# Patient Record
Sex: FEMALE | Race: WHITE | NOT HISPANIC OR LATINO | URBAN - METROPOLITAN AREA
[De-identification: names, ages, dates, MRNs, and addresses within clinical notes are randomized per-mention and may not be internally consistent; named-entity substitution may affect disease eponyms.]

---

## 2018-06-13 ENCOUNTER — OFFICE VISIT (OUTPATIENT)
Dept: FAMILY MEDICINE | Facility: CLINIC | Age: 28
End: 2018-06-13
Payer: COMMERCIAL

## 2018-06-13 VITALS
TEMPERATURE: 98.4 F | DIASTOLIC BLOOD PRESSURE: 72 MMHG | HEIGHT: 64 IN | BODY MASS INDEX: 29.26 KG/M2 | WEIGHT: 171.38 LBS | HEART RATE: 92 BPM | SYSTOLIC BLOOD PRESSURE: 118 MMHG

## 2018-06-13 DIAGNOSIS — Z12.4 SCREENING FOR MALIGNANT NEOPLASM OF CERVIX: ICD-10-CM

## 2018-06-13 DIAGNOSIS — Z23 NEED FOR PROPHYLACTIC VACCINATION WITH TETANUS-DIPHTHERIA (TD): ICD-10-CM

## 2018-06-13 DIAGNOSIS — G43.009 MIGRAINE WITHOUT AURA AND WITHOUT STATUS MIGRAINOSUS, NOT INTRACTABLE: ICD-10-CM

## 2018-06-13 DIAGNOSIS — Z76.89 ENCOUNTER TO ESTABLISH CARE: ICD-10-CM

## 2018-06-13 DIAGNOSIS — Z00.01 ENCOUNTER FOR ROUTINE ADULT MEDICAL EXAM WITH ABNORMAL FINDINGS: Primary | ICD-10-CM

## 2018-06-13 DIAGNOSIS — K21.9 GASTROESOPHAGEAL REFLUX DISEASE, ESOPHAGITIS PRESENCE NOT SPECIFIED: ICD-10-CM

## 2018-06-13 PROCEDURE — 99385 PREV VISIT NEW AGE 18-39: CPT | Mod: 25 | Performed by: PHYSICIAN ASSISTANT

## 2018-06-13 PROCEDURE — G0145 SCR C/V CYTO,THINLAYER,RESCR: HCPCS | Performed by: PHYSICIAN ASSISTANT

## 2018-06-13 PROCEDURE — 90715 TDAP VACCINE 7 YRS/> IM: CPT | Performed by: PHYSICIAN ASSISTANT

## 2018-06-13 PROCEDURE — 90471 IMMUNIZATION ADMIN: CPT | Performed by: PHYSICIAN ASSISTANT

## 2018-06-13 NOTE — PATIENT INSTRUCTIONS
PPI taper (Prilosec)  Try tapering off of this to decrease your risk of kidney disease and osteoporosis.  Take every other day, x 1 week, then every 3rd day x 1 week, then stop  Start over the counter Zantac at the same time you start the taper; 1 tablet twice daily (150 mg)  Can use Omeprazole as needed, or for short term for severe symptoms.    If doesn't work, may need scope of the esophagus.    Try holding the Excedrin for 2 weeks to see if your headaches could be due to medication overuse.  See information below, treat headaches with these recommendations.    [facility] headaches.    Ines Stewart PA-C                       Tension Headaches  Tension headaches cause a dull, steady pain on both sides of the head and in the neck and the back of the head. The eyes may also feel tired. Tension headaches can be triggered by lack of sleep, poor posture, eyestrain, stress, and other factors.        To help prevent tension headaches:    Make sure your work area is properly set up to help you avoid neck strain and eyestrain.    Make sure that your eyeglass prescription is current and is appropriate for the work you do.    Learn techniques for relaxing and reducing emotional stress. These include deep breathing, progressive relaxation, and biofeedback.    Maintain a regular exercise regimen under the guidance of a doctor. This can help keep your neck and back flexible, strong, and relaxed.  To relieve the pain:    Use moist heat to relax the muscles. Soak in a hot bath or wrap a warm, moist towel around your neck.    Brush your scalp lightly with a soft hairbrush.    Give yourself a massage. Knead the muscles running from your shoulders up the back of your skull.    Use an ice pack. Apply this directly to the place where you feel pain.    Rest. Sleeping often helps relieve headache pain.    Drink plenty of fluids. Dehydration is another trigger for headaches.             Preventive Health  Recommendations  Female Ages 26 - 39  Yearly exam:   See your health care provider every year in order to    Review health changes.     Discuss preventive care.      Review your medicines if you your doctor has prescribed any.    Until age 30: Get a Pap test every three years (more often if you have had an abnormal result).    After age 30: Talk to your doctor about whether you should have a Pap test every 3 years or have a Pap test with HPV screening every 5 years.   You do not need a Pap test if your uterus was removed (hysterectomy) and you have not had cancer.  You should be tested each year for STDs (sexually transmitted diseases), if you're at risk.   Talk to your provider about how often to have your cholesterol checked.  If you are at risk for diabetes, you should have a diabetes test (fasting glucose).  Shots: Get a flu shot each year. Get a tetanus shot every 10 years.   Nutrition:     Eat at least 5 servings of fruits and vegetables each day.    Eat whole-grain bread, whole-wheat pasta and brown rice instead of white grains and rice.    Talk to your provider about Calcium and Vitamin D.     Lifestyle    Exercise at least 150 minutes a week (30 minutes a day, 5 days of the week). This will help you control your weight and prevent disease.    Limit alcohol to one drink per day.    No smoking.     Wear sunscreen to prevent skin cancer.    See your dentist every six months for an exam and cleaning.  [facility]   Discharged by : Cori SPICER Certified Medical Assistant (AAMA)    If you have any questions regarding to your visit please contact your care team:     Team Silver              Clinic Hours Telephone Number     Dr. Ascencion Stewart PA-C   7am-7pm  Monday - Thursday   7am-5pm  Fridays  [phone]   (Appointment scheduling available 24/7)     RN Line  [phone] option 2     Urgent Care - [place] -  11am-9pm Monday-Friday Saturday-Sunday- 9am-5pm     [place] -   5pm-9pm Monday-Friday Saturday-Sunday- 9am-5pm    [phone] - [place]    [phone] - [place]     For a Price Quote for your services, please call our Consumer Price Line at [phone].     What options do I have for visits at the clinic other than the traditional office visit?     To expand how we care for you, many of our providers are utilizing electronic visits (e-visits) and telephone visits, when medically appropriate, for interactions with their patients rather than a visit in the clinic. We also offer nurse visits for many medical concerns. Just like any other service, we will bill your insurance company for this type of visit based on time spent on the phone with your provider. Not all insurance companies cover these visits. Please check with your medical insurance if this type of visit is covered. You will be responsible for any charges that are not paid by your insurance.   E-visits via Over[employer]: generally incur a $35.00 fee.     Telephone visits:   Time spent on the phone: *charged based on time that is spent on the phone in increments of 10 minutes. Estimated cost:   5-10 mins $30.00   11-20 mins. $59.00   21-30 mins. $85.00     Use [employer] (secure email communication and access to your chart) to send your primary care provider a message or make an appointment. Ask someone on your Team how to sign up for Over[employer].     As always, Thank you for trusting us with your health care needs!      [facility]:    Scheduling Appointments  [place] [place]  Call: [phone]    [place]  Call: [phone]    [facility]  Call: [phone]    For Gastroenterology referrals   [facility]   Clinics and Surgery Center, 4th Floor   [address] [place] [address]   Appointments: [phone]    WHERE TO GO FOR  CARE?  Clinic    Make an appointment if you:       Are sick (cold, cough, flu, sore throat, earache or in pain).       Have a small injury (sprain, small cut, burn or broken bone).       Need a physical exam, Pap smear, vaccine or prescription refill.       Have questions about your health or medicines.    To reach us:      Call [phone] ([phone]). Open 24 hours every day. (For counseling services, call [phone].)    Log into Home-Account at [employer]. (Visit [email or web address] to create an account.) Hospital emergency room    An emergency is a serious or life- threatening problem that must be treated right away.    Call 159 or get to the hospital if you have:      Very bad or sudden:            - Chest pain or pressure         - Bleeding         - Head or belly pain         - Dizziness or trouble seeing, walking or                          Speaking      Problems breathing      Blood in your vomit or you are coughing up blood      A major injury (knocked out, loss of a finger or limb, rape, broken bone protruding from skin)    A mental health crisis. (Or call the Mental Health Crisis line at 1-138.537.4902 or Suicide Prevention Hotline at 1-609.649.9639.)    Open 24 hours every day. You don't need an appointment.     Urgent care    Visit urgent care for sickness or small injuries when the clinic is closed. You don't need an appointment. To check hours or find an urgent care near you, visit [email or web address]. Online care    Get online care from [facility] for more than 70 common problems, like colds, allergies and infections. Open 24 hours every day at:   [email or web address]   Need help deciding?    For advice about where to be seen, you may call your clinic and ask to speak with a nurse. We're here for you 24 hours every day.         If you are deaf or hard of hearing, please let us know. We provide many free services including sign language interpreters, oral interpreters, TTYs, telephone  amplifiers, note takers and written materials.

## 2018-06-13 NOTE — NURSING NOTE
Prior to injection verified patient identity using patient's name and date of birth.  Due to injection administration, patient instructed to remain in clinic for 15 minutes  afterwards, and to report any adverse reaction to me immediately.    Screening Questionnaire for Adult Immunization    Are you sick today?   No   Do you have allergies to medications, food, a vaccine component or latex?   No   Have you ever had a serious reaction after receiving a vaccination?   No   Do you have a long-term health problem with heart disease, lung disease, asthma, kidney disease, metabolic disease (e.g. diabetes), anemia, or other blood disorder?   No   Do you have cancer, leukemia, HIV/AIDS, or any other immune system problem?   No   In the past 3 months, have you taken medications that affect  your immune system, such as prednisone, other steroids, or anticancer drugs; drugs for the treatment of rheumatoid arthritis, Crohn s disease, or psoriasis; or have you had radiation treatments?   No   Have you had a seizure, or a brain or other nervous system problem?   No   During the past year, have you received a transfusion of blood or blood     products, or been given immune (gamma) globulin or antiviral drug?   No   For women: Are you pregnant or is there a chance you could become        pregnant during the next month?   No   Have you received any vaccinations in the past 4 weeks?   No     Immunization questionnaire answers were all negative.        Per orders of Ines Stewart, injection of Adacel given by Cori Johnson. Patient instructed to remain in clinic for 15 minutes afterwards, and to report any adverse reaction to me immediately.       Screening performed by Cori Johnson on 6/13/2018 at 10:54 AM.

## 2018-06-13 NOTE — LETTER
June 20, 2018      Afsaneh Estrella  [address] [place] [address]    Dear ,      I am happy to inform you that your recent cervical cancer screening test (PAP smear) was normal.      Preventative screenings such as this help to ensure your health for years to come. You should repeat a pap smear in 3 years, unless otherwise directed.      You will still need to return to the clinic every year for your annual exam and other preventive tests.     Please contact the clinic at [phone] if you have further questions.       Sincerely,      Ines Stewart PA-C/delano

## 2018-06-13 NOTE — MR AVS SNAPSHOT
After Visit Summary   6/13/2018    Afsaneh Estrella    MRN: [id number]           Patient Information     Date Of Birth          1990        Visit Information        Provider Department      6/13/2018 9:40 AM Ines Stewart PA-C [facility]        Today's Diagnoses     Encounter for routine adult medical exam with abnormal findings    -  1    Screening for malignant neoplasm of cervix        Need for prophylactic vaccination with tetanus-diphtheria (TD)        Encounter to establish care        Gastroesophageal reflux disease, esophagitis presence not specified        Migraine without aura and without status migrainosus, not intractable          Care Instructions    PPI taper (Prilosec)  Try tapering off of this to decrease your risk of kidney disease and osteoporosis.  Take every other day, x 1 week, then every 3rd day x 1 week, then stop  Start over the counter Zantac at the same time you start the taper; 1 tablet twice daily (150 mg)  Can use Omeprazole as needed, or for short term for severe symptoms.    If doesn't work, may need scope of the esophagus.    Try holding the Excedrin for 2 weeks to see if your headaches could be due to medication overuse.  See information below, treat headaches with these recommendations.    [facility] with headaches.    Ines Stewart PA-C                       Tension Headaches  Tension headaches cause a dull, steady pain on both sides of the head and in the neck and the back of the head. The eyes may also feel tired. Tension headaches can be triggered by lack of sleep, poor posture, eyestrain, stress, and other factors.        To help prevent tension headaches:    Make sure your work area is properly set up to help you avoid neck strain and eyestrain.    Make sure that your eyeglass prescription is current and is appropriate for the work you do.    Learn techniques for relaxing and reducing emotional stress. These  include deep breathing, progressive relaxation, and biofeedback.    Maintain a regular exercise regimen under the guidance of a doctor. This can help keep your neck and back flexible, strong, and relaxed.  To relieve the pain:    Use moist heat to relax the muscles. Soak in a hot bath or wrap a warm, moist towel around your neck.    Brush your scalp lightly with a soft hairbrush.    Give yourself a massage. Knead the muscles running from your shoulders up the back of your skull.    Use an ice pack. Apply this directly to the place where you feel pain.    Rest. Sleeping often helps relieve headache pain.    Drink plenty of fluids. Dehydration is another trigger for headaches.             Preventive Health Recommendations  Female Ages 26 - 39  Yearly exam:   See your health care provider every year in order to    Review health changes.     Discuss preventive care.      Review your medicines if you your doctor has prescribed any.    Until age 30: Get a Pap test every three years (more often if you have had an abnormal result).    After age 30: Talk to your doctor about whether you should have a Pap test every 3 years or have a Pap test with HPV screening every 5 years.   You do not need a Pap test if your uterus was removed (hysterectomy) and you have not had cancer.  You should be tested each year for STDs (sexually transmitted diseases), if you're at risk.   Talk to your provider about how often to have your cholesterol checked.  If you are at risk for diabetes, you should have a diabetes test (fasting glucose).  Shots: Get a flu shot each year. Get a tetanus shot every 10 years.   Nutrition:     Eat at least 5 servings of fruits and vegetables each day.    Eat whole-grain bread, whole-wheat pasta and brown rice instead of white grains and rice.    Talk to your provider about Calcium and Vitamin D.     Lifestyle    Exercise at least 150 minutes a week (30 minutes a day, 5 days of the week). This will help you control  your weight and prevent disease.    Limit alcohol to one drink per day.    No smoking.     Wear sunscreen to prevent skin cancer.    See your dentist every six months for an exam and cleaning.  [facility]   Discharged by : Cori SPICER Certified Medical Assistant (AAMA)    If you have any questions regarding to your visit please contact your care team:     Team Silver              Clinic Hours Telephone Number     Dr. Ascencion Stewart PA-C   7am-7pm  Monday - Thursday   7am-5pm  Fridays  [phone]   (Appointment scheduling available 24/7)     RN Line  [phone] option 2     [facility] [place] [facility] 11am-9pm Monday-Friday Saturday-Sunday- 9am-5pm     [place] -   5pm-9pm Monday-Friday Saturday-Sunday- 9am-5pm    [phone] - [place]    [phone] - [place]     For a Price Quote for your services, please call our Consumer Price Line at [phone].     What options do I have for visits at the clinic other than the traditional office visit?     To expand how we care for you, many of our providers are utilizing electronic visits (e-visits) and telephone visits, when medically appropriate, for interactions with their patients rather than a visit in the clinic. We also offer nurse visits for many medical concerns. Just like any other service, we will bill your insurance company for this type of visit based on time spent on the phone with your provider. Not all insurance companies cover these visits. Please check with your medical insurance if this type of visit is covered. You will be responsible for any charges that are not paid by your insurance.   E-visits via iFood: generally incur a $35.00 fee.     Telephone visits:   Time spent on the phone: *charged based on time that is spent on the phone in increments of 10 minutes. Estimated cost:   5-10 mins $30.00   11-20 mins. $59.00   21-30 mins. $85.00      Use [employer] (secure email communication and access to your chart) to send your primary care provider a message or make an appointment. Ask someone on your Team how to sign up for [employer].     As always, Thank you for trusting us with your health care needs!      [place] [facility]:    Scheduling Appointments  [facility]  Call: [phone]    [facility]  Call: [phone]    [facility]  Call: [phone]    For Gastroenterology referrals   [facility]   Clinics and Surgery Center, 4th Floor   [address] [place] [address]   Appointments: [phone]    WHERE TO GO FOR CARE?  Clinic    Make an appointment if you:       Are sick (cold, cough, flu, sore throat, earache or in pain).       Have a small injury (sprain, small cut, burn or broken bone).       Need a physical exam, Pap smear, vaccine or prescription refill.       Have questions about your health or medicines.    To reach us:      Call [phone] ([phone]). Open 24 hours every day. (For counseling services, call [phone].)    Log into [employer] at [email or web address]. (Visit [email or web address] to create an account.) Hospital emergency room    An emergency is a serious or life- threatening problem that must be treated right away.    Call 363 or get to the hospital if you have:      Very bad or sudden:            - Chest pain or pressure         - Bleeding         - Head or belly pain         - Dizziness or trouble seeing, walking or                          Speaking      Problems breathing      Blood in your vomit or you are coughing up blood      A major injury (knocked out, loss of a finger or limb, rape, broken bone protruding from skin)    A mental health crisis. (Or call the Mental Health Crisis line at 1-667.526.1614 or Suicide Prevention Hotline at 1-472.876.2881.)    Open 24 hours every day. You don't need an  "appointment.     Urgent care    Visit urgent care for sickness or small injuries when the clinic is closed. You don't need an appointment. To check hours or find an urgent care near you, visit [email or web address]. Online care    Get online care from [facility] for more than 70 common problems, like colds, allergies and infections. Open 24 hours every day at:   [email or web address]   Need help deciding?    For advice about where to be seen, you may call your clinic and ask to speak with a nurse. We're here for you 24 hours every day.         If you are deaf or hard of hearing, please let us know. We provide many free services including sign language interpreters, oral interpreters, TTYs, telephone amplifiers, note takers and written materials.               Follow-ups after your visit        Who to contact     If you have questions or need follow up information about today's clinic visit or your schedule please contact [facility] directly at [phone].  Normal or non-critical lab and imaging results will be communicated to you by MyChart, letter or phone within 4 business days after the clinic has received the results. If you do not hear from us within 7 days, please contact the clinic through LocPlanethart or phone. If you have a critical or abnormal lab result, we will notify you by phone as soon as possible.  Submit refill requests through [employer] or call your pharmacy and they will forward the refill request to us. Please allow 3 business days for your refill to be completed.          Additional Information About Your Visit        Care EveryWhere ID     This is your Care EveryWhere ID. This could be used by other organizations to access your [place] medical records  [id number]        Your Vitals Were     Pulse Temperature Height Last Period BMI (Body Mass Index)       92 98.4  F (36.9  C) (Oral) 5' 4.25\" (1.632 m) 04/18/2018 29.19 kg/m2        Blood Pressure from Last 3 Encounters:   06/13/18 118/72    " Weight from Last 3 Encounters:   06/13/18 171 lb 6 oz (77.7 kg)              We Performed the Following     Pap imaged thin layer screen reflex to HPV if ASCUS - recommend age 25 - 29     TDAP VACCINE (ADACEL)        Primary Care Provider Office Phone # Fax #    Ines Patricia Stewart PA-C [phone]       [address] [address]        Equal Access to Services     BERNARDO BRITT : Hadii aad ku hadasho Soomaali, waaxda luqadaha, qaybta kaalmada adeegyada, waxay idiin hayaan adeeg kharash la'aan . So [facility] [phone].    ATENCIÓN: Si habla español, tiene a dumont disposición servicios gratuitos de asistencia lingüística. Llame al [phone].    We comply with applicable federal civil rights laws and [place] laws. We do not discriminate on the basis of race, color, national origin, age, disability, sex, sexual orientation, or gender identity.            Thank you!     Thank you for choosing [facility]  for your care. Our goal is always to provide you with excellent care. Hearing back from our patients is one way we can continue to improve our services. Please take a few minutes to complete the written survey that you may receive in the mail after your visit with us. Thank you!             Your Updated Medication List - Protect others around you: Learn how to safely use, store and throw away your medicines at www.disposemymeds.org.          This list is accurate as of 6/13/18 10:49 AM.  Always use your most recent med list.                   Brand Name Dispense Instructions for use Diagnosis    PRILOSEC PO           STOOL SOFTENER PO

## 2018-06-13 NOTE — PROGRESS NOTES
SUBJECTIVE:   CC: Afsaneh Estrella is an 28 year old woman who presents for preventive health visit.     Physical   Annual:     Getting at least 3 servings of Calcium per day::  Yes    Bi-annual eye exam::  Yes    Dental care twice a year::  Yes    Sleep apnea or symptoms of sleep apnea::  Daytime drowsiness    Diet::  Regular (no restrictions)    Frequency of exercise::  4-5 days/week    Duration of exercise::  Greater than 60 minutes    Taking medications regularly::  Yes    Medication side effects::  None    Additional concerns today::  YES (Discuss acid reflux.  Has history migraines and family history of them also.  Discuss medication options. Has history of irregular periods. Has psoriosis.)              Patient is here today to Establish Care.  Coming from [facility] (has not been seen in years though).  AUSTIN was completed by patient today while in office.    Has been taking Omeprazole 20 mg per day at night for quite awhile.  Coughing up food, happens more with spicy foods, worse with alcohol. Tried changing to Zantac when warnings regarding PPIs came. No family history.  Started in 9th grade, recalls stress. If avoids her food triggers, and takes her Prilosec daily then symptoms are well controlled. Normal BMs, no black benjamín    Migraines-has strong FH of this in mom, sister, grandfather.    Has a lot of triggers (hungry, tired, thirsty, changing weather, temp changes. Possible tension type headaches as well.  Massage of neck helps with these symptoms.  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  Has used Imitrex in the past but doesn't tolerate this very well. Excedrin works if takes it right away.  Takes this daily, caffeine helps, sleep.  At times wakes up with these.  Hard to differentiate between tension and migraine    Has psoriasis, fairly well controlled.  Scalp and plaque psoriasis. Worse lately as she is working inside and has less sun exposure.  Has a dermatologist, on different 3 different  "prescriptions for this.  Shampoo, scalp solution.    Irregular periods, negative work up.  Was on OCP and was regulated.  Now, has been of this x 4 years and is irregular but she is okay with this. Age of Menarche in 3rd grade       Today's PHQ-2 Score:   PHQ-2 ( 1999 Pfizer) 6/13/2018   Q1: Little interest or pleasure in doing things 1   Q2: Feeling down, depressed or hopeless 0   PHQ-2 Score 1   Q1: Little interest or pleasure in doing things Several days   Q2: Feeling down, depressed or hopeless Not at all   PHQ-2 Score 1       Abuse: Current or Past(Physical, Sexual or Emotional)- No  Do you feel safe in your environment - Yes    Social History   Substance Use Topics     Smoking status: Never Smoker     Smokeless tobacco: Never Used     Alcohol use Yes      Comment: Very rarely     Alcohol Use 6/13/2018   If you drink alcohol do you typically have greater than 3 drinks per day OR greater than 7 drinks per week? No   No flowsheet data found.    Reviewed orders with patient.  Reviewed health maintenance and updated orders accordingly - Yes  Labs reviewed in [facility]    Mammogram not appropriate for this patient based on age.    Pertinent mammograms are reviewed under the imaging tab.  History of abnormal Pap smear: NO - age 21-29 PAP every 3 years recommended    Reviewed and updated as needed this visit by clinical staff  Tobacco  Allergies  Meds  Problems  Med Hx  Surg Hx  Fam Hx  Soc Hx          Reviewed and updated as needed this visit by Provider  Tobacco  Allergies  Meds  Problems  Med Hx  Surg Hx  Fam Hx  Soc Hx             Review of Systems  Constitutional, HEENT, cardiovascular, pulmonary, GI, , musculoskeletal, neuro, skin, endocrine and psych systems are negative, except as otherwise noted.     OBJECTIVE:   /72 (BP Location: Right arm, Cuff Size: Adult Large)  Pulse 92  Temp 98.4  F (36.9  C) (Oral)  Ht 5' 4.25\" (1.632 m)  Wt 171 lb 6 oz (77.7 kg)  LMP 04/18/2018  BMI 29.19 " kg/m2  Physical Exam  GENERAL: healthy, alert and no distress  EYES: Eyes grossly normal to inspection, PERRL and conjunctivae and sclerae normal  HENT: ear canals and TM's normal, nose and mouth without ulcers or lesions  NECK: no adenopathy, no asymmetry, masses, or scars and thyroid normal to palpation  RESP: lungs clear to auscultation - no rales, rhonchi or wheezes  BREAST: normal without masses, tenderness or nipple discharge and no palpable axillary masses or adenopathy  CV: regular rate and rhythm, normal S1 S2, no S3 or S4, no murmur, click or rub, no peripheral edema and peripheral pulses strong  ABDOMEN: soft, nontender, no hepatosplenomegaly, no masses and bowel sounds normal   (female): normal female external genitalia, normal urethral meatus, vaginal mucosa pink, moist, well rugated, and normal cervix/adnexa/uterus without masses or discharge  MS: no gross musculoskeletal defects noted, no edema  SKIN: no suspicious lesions or rashes  NEURO: Normal strength and tone, mentation intact and speech normal  PSYCH: mentation appears normal, affect normal/bright    ASSESSMENT/PLAN:   1. Encounter for routine adult medical exam with abnormal findings  2. Encounter to establish care  Follow up pending above results. Encouraged healthy diet and regular exercise, monthy SBE, and yearly exams.    3. Screening for malignant neoplasm of cervix  - Pap imaged thin layer screen reflex to HPV if ASCUS - recommend age 25 - 29    4. Gastroesophageal reflux disease, esophagitis presence not specified  Discussed risks associated with long term use of PPIs.  Discussed using H2 Blocker for symptom control, and use of PPI as needed.     5. Migraine without aura and without status migrainosus, not intractable  In setting of tension headaches and possible medication overuse headache.  She will try and hold her Excedrin x 2 weeks  Recommended conservative treatment with ice to area of pain, heat to neck, increase in fluids and  "improved sleep. Encouraged gentle ROM exercises of neck and upper back.   Discussed warning s/s for which to seek emergent care.  MyChart in 1-2 months.    6. Need for prophylactic vaccination with tetanus-diphtheria (TD)  - TDAP VACCINE ([facility])    COUNSELING:  Reviewed preventive health counseling, as reflected in patient instructions         reports that she has never smoked. She has never used smokeless tobacco.    Estimated body mass index is 29.19 kg/(m^2) as calculated from the following:    Height as of this encounter: 5' 4.25\" (1.632 m).    Weight as of this encounter: 171 lb 6 oz (77.7 kg).   Weight management plan: Discussed healthy diet and exercise guidelines and patient will follow up in 12 months in clinic to re-evaluate.    Counseling Resources:  ATP IV Guidelines  Pooled Cohorts Equation Calculator  Breast Cancer Risk Calculator  FRAX Risk Assessment  ICSI Preventive Guidelines  Dietary Guidelines for Americans, 2010  USDA's MyPlate  ASA Prophylaxis  Lung CA Screening    Ines Stewart PA-C  [facility]  Answers for HPI/ROS submitted by the patient on 6/13/2018   PHQ-2 Score: 1    "

## 2018-06-15 LAB
COPATH REPORT: NORMAL
PAP: NORMAL

## 2018-06-20 ENCOUNTER — DOCUMENTATION ONLY (OUTPATIENT)
Dept: FAMILY MEDICINE | Facility: CLINIC | Age: 28
End: 2018-06-20

## 2018-06-20 NOTE — PROGRESS NOTES
Records received from [facility].  Routed to Ines Stewart PA-C to review and return to be scanned into chart.      .Gin Aguilera  Patient Representative

## 2018-06-25 ENCOUNTER — DOCUMENTATION ONLY (OUTPATIENT)
Dept: FAMILY MEDICINE | Facility: CLINIC | Age: 28
End: 2018-06-25

## 2018-06-25 NOTE — PROGRESS NOTES
Records received from [facility].  Routed to Ines Stewart PA-C to review and return to be scanned into chart.    .Gin Aguilera  Patient Representative

## 2019-06-17 ENCOUNTER — OFFICE VISIT (OUTPATIENT)
Dept: FAMILY MEDICINE | Facility: CLINIC | Age: 29
End: 2019-06-17
Payer: COMMERCIAL

## 2019-06-17 VITALS
SYSTOLIC BLOOD PRESSURE: 117 MMHG | TEMPERATURE: 98.6 F | OXYGEN SATURATION: 97 % | WEIGHT: 154.13 LBS | HEART RATE: 79 BPM | HEIGHT: 64 IN | BODY MASS INDEX: 26.31 KG/M2 | DIASTOLIC BLOOD PRESSURE: 86 MMHG

## 2019-06-17 DIAGNOSIS — M25.561 PAIN IN BOTH KNEES, UNSPECIFIED CHRONICITY: ICD-10-CM

## 2019-06-17 DIAGNOSIS — K21.9 GASTROESOPHAGEAL REFLUX DISEASE, ESOPHAGITIS PRESENCE NOT SPECIFIED: ICD-10-CM

## 2019-06-17 DIAGNOSIS — F81.9 LEARNING PROBLEM: ICD-10-CM

## 2019-06-17 DIAGNOSIS — M25.562 PAIN IN BOTH KNEES, UNSPECIFIED CHRONICITY: ICD-10-CM

## 2019-06-17 DIAGNOSIS — Z00.01 ENCOUNTER FOR ROUTINE ADULT MEDICAL EXAM WITH ABNORMAL FINDINGS: Primary | ICD-10-CM

## 2019-06-17 PROCEDURE — 99395 PREV VISIT EST AGE 18-39: CPT | Performed by: PHYSICIAN ASSISTANT

## 2019-06-17 ASSESSMENT — ENCOUNTER SYMPTOMS
ABDOMINAL PAIN: 0
PALPITATIONS: 0
BREAST MASS: 0
WEAKNESS: 0
HEADACHES: 0
HEMATOCHEZIA: 0
CONSTIPATION: 0
HEARTBURN: 0
PARESTHESIAS: 0
SORE THROAT: 0
COUGH: 0
NAUSEA: 0
CHILLS: 0
JOINT SWELLING: 0
FEVER: 0
EYE PAIN: 0
FREQUENCY: 0
ARTHRALGIAS: 0
DIARRHEA: 0
DIZZINESS: 0
MYALGIAS: 0
NERVOUS/ANXIOUS: 0
HEMATURIA: 0
DYSURIA: 0
SHORTNESS OF BREATH: 0

## 2019-06-17 ASSESSMENT — MIFFLIN-ST. JEOR: SCORE: 1417.17

## 2019-06-17 ASSESSMENT — PAIN SCALES - GENERAL: PAINLEVEL: NO PAIN (0)

## 2019-06-17 NOTE — PROGRESS NOTES
"   SUBJECTIVE:   CC: Afsaneh Estrella is an 29 year old woman who presents for preventive health visit.     Healthy Habits:     Getting at least 3 servings of Calcium per day:  Yes    Bi-annual eye exam:  Yes    Dental care twice a year:  Yes    Sleep apnea or symptoms of sleep apnea:  None    Diet:  Regular (no restrictions)    Frequency of exercise:  6-7 days/week    Duration of exercise:  45-60 minutes    Taking medications regularly:  Yes    Medication side effects:  None    PHQ-2 Total Score: 0    Additional concerns today:  Yes      GERD has been fairly well controlled but still having symptoms on medication. she has switched to Zantac and is off of the Prilosec. Has lost nearly 20 lbs!      Discuss knee pain and seeing physical therapy-Has also always had an issue with her knees.  R knee tends to be more of an issue but is present in both.  After long walk around [place], had persistent R knee and hip pain.  Was told in the past that the issue was the strength of her quadriceps. Potentially worked this too hard.    Discuss a learning disability-has always had difficulty with numbers and math.  Feels like she has \"Dyscalcula?\" and would like to have this checked out.    Today's PHQ-2 Score:   PHQ-2 ( 1999 Pfizer) 6/17/2019   Q1: Little interest or pleasure in doing things 0   Q2: Feeling down, depressed or hopeless 0   PHQ-2 Score 0   Q1: Little interest or pleasure in doing things Not at all   Q2: Feeling down, depressed or hopeless Not at all   PHQ-2 Score 0       Abuse: Current or Past(Physical, Sexual or Emotional)- No  Do you feel safe in your environment? Yes    Social History     Tobacco Use     Smoking status: Never Smoker     Smokeless tobacco: Never Used   Substance Use Topics     Alcohol use: Yes     Comment: Very rarely     If you drink alcohol do you typically have >3 drinks per day or >7 drinks per week? No    Alcohol Use 6/17/2019   Prescreen: >3 drinks/day or >7 drinks/week? No   Prescreen: >3 " "drinks/day or >7 drinks/week? -   No flowsheet data found.    Reviewed orders with patient.  Reviewed health maintenance and updated orders accordingly - Yes    Mammogram not appropriate for this patient based on age.    Pertinent mammograms are reviewed under the imaging tab.  History of abnormal Pap smear: NO - age 21-29 PAP every 3 years recommended  PAP / HPV 6/13/2018   PAP NIL     Reviewed and updated as needed this visit by clinical staff  Tobacco  Allergies  Meds  Problems  Med Hx  Surg Hx  Fam Hx  Soc Hx          Reviewed and updated as needed this visit by Provider  Meds  Problems            Review of Systems   Constitutional: Negative for chills and fever.   HENT: Negative for congestion, ear pain, hearing loss and sore throat.    Eyes: Negative for pain and visual disturbance.   Respiratory: Negative for cough and shortness of breath.    Cardiovascular: Negative for chest pain, palpitations and peripheral edema.   Gastrointestinal: Negative for abdominal pain, constipation, diarrhea, heartburn, hematochezia and nausea.   Breasts:  Negative for tenderness, breast mass and discharge.   Genitourinary: Negative for dysuria, frequency, genital sores, hematuria, pelvic pain, urgency, vaginal bleeding and vaginal discharge.   Musculoskeletal: Negative for arthralgias, joint swelling and myalgias.   Skin: Negative for rash.   Neurological: Negative for dizziness, weakness, headaches and paresthesias.   Psychiatric/Behavioral: Negative for mood changes. The patient is not nervous/anxious.      OBJECTIVE:   /86 (BP Location: Right arm, Patient Position: Chair, Cuff Size: Adult Regular)   Pulse 79   Temp 98.6  F (37  C) (Oral)   Ht 1.623 m (5' 3.88\")   Wt 69.9 kg (154 lb 2 oz)   LMP 05/17/2019   SpO2 97%   Breastfeeding? No   BMI 26.56 kg/m    Physical Exam  GENERAL: healthy, alert and no distress  EYES: Eyes grossly normal to inspection, PERRL and conjunctivae and sclerae normal  HENT: ear " "canals and TM's normal, nose and mouth without ulcers or lesions  NECK: no adenopathy, no asymmetry, masses, or scars and thyroid normal to palpation  RESP: lungs clear to auscultation - no rales, rhonchi or wheezes  BREAST: normal without masses, tenderness or nipple discharge and no palpable axillary masses or adenopathy  CV: regular rate and rhythm, normal S1 S2, no S3 or S4, no murmur, click or rub, no peripheral edema and peripheral pulses strong  ABDOMEN: soft, nontender, no hepatosplenomegaly, no masses and bowel sounds normal  MS: no gross musculoskeletal defects noted, no edema  SKIN: no suspicious lesions or rashes  NEURO: Normal strength and tone, mentation intact and speech normal  PSYCH: mentation appears normal, affect normal/bright    Diagnostic Test Results:  Labs reviewed in Epic    ASSESSMENT/PLAN:   1. Encounter for routine adult medical exam with abnormal findings  Follow up pending above results. Encouraged healthy diet and regular exercise, monthy SBE, and yearly exams.    2. Pain in both knees, unspecified chronicity  Referral to [facility]  - [facility] AND CHIROPRACTIC REFERRAL; Future    3. Gastroesophageal reflux disease, esophagitis presence not specified  Referral for EGD given persistence of symptoms despite treatment, weight loss,   - GASTROENTEROLOGY ADULT REF PROCEDURE ONLY Other; [facility] [phone]    4. Learning problem  Provided pt with [place] Learning Disability website information. She will inquire about an assessment.    COUNSELING:  Reviewed preventive health counseling, as reflected in patient instructions    Estimated body mass index is 26.56 kg/m  as calculated from the following:    Height as of this encounter: 1.623 m (5' 3.88\").    Weight as of this encounter: 69.9 kg (154 lb 2 oz).    Weight management plan: Discussed healthy diet and exercise guidelines     reports that she has never smoked. She has never used smokeless tobacco.      Counseling Resources:  ATP IV " Guidelines  Pooled Cohorts Equation Calculator  Breast Cancer Risk Calculator  FRAX Risk Assessment  ICSI Preventive Guidelines  Dietary Guidelines for Americans, 2010  USDA's MyPlate  ASA Prophylaxis  Lung CA Screening    Ines Stewart PA-C  [facility]

## 2019-06-17 NOTE — PATIENT INSTRUCTIONS
[facility] will call you to schedule an appointment for physical therapy.  [facility] will call you to schedule an appointment for scope of your esophagus and stomach.    Ines or her team will be in touch with you with results.    Keep up the great work with the weight loss!     Ines Stewart PA-C

## 2019-07-09 ENCOUNTER — THERAPY VISIT (OUTPATIENT)
Dept: PHYSICAL THERAPY | Facility: CLINIC | Age: 29
End: 2019-07-09
Payer: COMMERCIAL

## 2019-07-09 DIAGNOSIS — M25.562 PAIN IN BOTH KNEES, UNSPECIFIED CHRONICITY: ICD-10-CM

## 2019-07-09 DIAGNOSIS — M22.2X9 PATELLOFEMORAL PAIN SYNDROME: ICD-10-CM

## 2019-07-09 DIAGNOSIS — M25.562 PAIN IN BOTH KNEES: ICD-10-CM

## 2019-07-09 DIAGNOSIS — M25.561 PAIN IN BOTH KNEES: ICD-10-CM

## 2019-07-09 DIAGNOSIS — M25.561 PAIN IN BOTH KNEES, UNSPECIFIED CHRONICITY: ICD-10-CM

## 2019-07-09 PROCEDURE — 97530 THERAPEUTIC ACTIVITIES: CPT | Mod: GP | Performed by: PHYSICAL THERAPIST

## 2019-07-09 PROCEDURE — 97110 THERAPEUTIC EXERCISES: CPT | Mod: GP | Performed by: PHYSICAL THERAPIST

## 2019-07-09 PROCEDURE — 97161 PT EVAL LOW COMPLEX 20 MIN: CPT | Mod: GP | Performed by: PHYSICAL THERAPIST

## 2019-07-09 NOTE — PROGRESS NOTES
[facility] Initial Evaluation  Subjective:    Afsaneh Estrella being seen for R Knee Joint pain, R hip socket pain.   Problem began 1/1/2008. Where condition occurred: for unknown reasons.Problem occurred: It comes up when I do cardio work out.  and reported as 1/10 on pain scale. General health as reported by patient is good. Pertinent medical history includes:  Migraines/headaches and other. Other medical history details: Kidney stone.    Surgeries include:  Other. Other surgery history details: ureteroscopy.     Primary job tasks include:  Computer work and prolonged sitting.  Pain is described as other and is intermittent. Pain is worse in the P.M.. Since onset symptoms are gradually worsening and rapidly worsening.      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    Barriers include:  None as reported by patient.  Red flags:  Severe headaches.  Type of problem:  Bilateral knees (Right>left)   Condition occurred with:  Repetition/overuse. This is a chronic condition   Problem details: Patient reports a long history or bilateral knee pain as long as she can remember and she is unable to explain why. Patient reports a recent worsening of bilateral knee pain in November 2018 after due to longer and more frequent workouts..   Site of Pain: right medial and left lateral knee pain. currently only has right knee pain.  Associated symptoms:  Loss of motion/stiffness. Symptoms are exacerbated by bending/squatting, walking, weight bearing, descending stairs, ascending stairs and standing and relieved by rest.                      Objective:    Gait:    Gait Type:  Normal   Assistive Devices:  None                                                        Knee Evaluation:  ROM:  AROM: normal  Strength wnl knee: grossly 4+/5 bilaterally with good quad set bilaterally.            Ligament Testing:  Normal                Special Tests:     Left knee negative  for the following special tests:  Meninscal and Patellar compression  Right knee positive for the following tests:  Meniscal (mild anteriomedial right knee pain with thesslys) and Patellar Compression  Palpation:  Normal      Edema:  Normal    Mobility Testing:  Normal            Functional Testing:          Quad:    Single Leg Squat:  Left:       Moderate loss of control, excessive anterior knee excursion and femoral IR  Right:       Significant loss of control, femoral IR and excessive anterior knee excursion  Bilateral Leg Squat:   Moderate loss of control and excessive anterior knee excursion              General     ROS    Assessment/Plan:    Patient is a 29 year old female with both sides knee complaints.    Patient has the following significant findings with corresponding treatment plan.                Diagnosis 1:  Bilateral knee pain  Pain -  hot/cold therapy, self management, education and home program  Decreased strength - therapeutic exercise, therapeutic activities and home program  Decreased function - therapeutic activities and home program    Therapy Evaluation Codes:   1) History comprised of:   Personal factors that impact the plan of care:      None.    Comorbidity factors that impact the plan of care are:      None.     Medications impacting care: None.  2) Examination of Body Systems comprised of:   Body structures and functions that impact the plan of care:      Knee.   Activity limitations that impact the plan of care are:      Jumping, Running, Sports, Squatting/kneeling, Stairs, Standing and Walking.  3) Clinical presentation characteristics are:   Stable/Uncomplicated.  4) Decision-Making    Low complexity using standardized patient assessment instrument and/or measureable assessment of functional outcome.  Cumulative Therapy Evaluation is: Low complexity.    Previous and current functional limitations:  (See Goal Flow Sheet for this information)    Short term and Long term goals: (See Goal  Flow Sheet for this information)     Communication ability:  Patient appears to be able to clearly communicate and understand verbal and written communication and follow directions correctly.  Treatment Explanation - The following has been discussed with the patient:   RX ordered/plan of care  Anticipated outcomes  Possible risks and side effects  This patient would benefit from PT intervention to resume normal activities.   Rehab potential is good.    Frequency:  1 X week, once daily  Duration:  for 6 weeks  Discharge Plan:  Achieve all LTG.  Independent in home treatment program.  Reach maximal therapeutic benefit.    Please refer to the daily flowsheet for treatment today, total treatment time and time spent performing 1:1 timed codes.

## 2019-07-09 NOTE — LETTER
[facility]  [address]  [address]  [place] [address]  [phone]    July 10, 2019    Re: Afsaneh Estrella   :   1990  MRN:  [id number]   REFERRING PHYSICIAN:   NADIA Goldberg PT  Date of Initial Evaluation:  2019  Visits:  Rxs Used: 1  Reason for Referral:     Pain in both knees, unspecified chronicity  Pain in both knees  Patellofemoral pain syndrome    EVALUATION SUMMARY    [facility] Initial Evaluation  Subjective:  Afsaneh Estrella being seen for R Knee Joint pain, R hip socket pain.   Problem began 2008. Where condition occurred: for unknown reasons.Problem occurred: It comes up when I do cardio work out.  and reported as 1/10 on pain scale. General health as reported by patient is good. Pertinent medical history includes:  Migraines/headaches and other. Other medical history details: Kidney stone.    Surgeries include:  Other. Other surgery history details: ureteroscopy.     Primary job tasks include:  Computer work and prolonged sitting.  Pain is described as other and is intermittent. Pain is worse in the P.M.. Since onset symptoms are gradually worsening and rapidly worsening.  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    Barriers include:  None as reported by patient.  Red flags:  Severe headaches.  Type of problem:  Bilateral knees (Right>left)  Condition occurred with:  Repetition/overuse. This is a chronic condition   Problem details: Patient reports a long history or bilateral knee pain as long as she can remember and she is unable to explain why. Patient reports a recent worsening of bilateral knee pain in 2018 after due to longer and more frequent workouts..   Site of Pain: right medial and left lateral knee pain. currently only has right knee pain.  Associated symptoms:  Loss of motion/stiffness. Symptoms are exacerbated by bending/squatting, walking, weight  bearing, descending stairs, ascending stairs and standing and relieved by rest.            Objective:  Gait:    Gait Type:  Normal   Assistive Devices:  None    Knee Evaluation:  ROM:  AROM: normal  Strength wnl knee: grossly 4+/5 bilaterally with good quad set bilaterally.  Re: Afsaneh Sameer   :   1990    Ligament Testing:  Normal    Special Tests:   Left knee negative for the following special tests:  Meninscal and Patellar compression  Right knee positive for the following tests:  Meniscal (mild anteriomedial right knee pain with thesslys) and Patellar Compression    Palpation:  Normal  Edema:  Normal  Mobility Testing:  Normal    Functional Testing:    Quad:    Single Leg Squat:  Left:       Moderate loss of control, excessive anterior knee excursion and femoral IR  Right:       Significant loss of control, femoral IR and excessive anterior knee excursion  Bilateral Leg Squat:   Moderate loss of control and excessive anterior knee excursion      Assessment/Plan:    Patient is a 29 year old female with both sides knee complaints.    Patient has the following significant findings with corresponding treatment plan.                Diagnosis 1:  Bilateral knee pain  Pain -  hot/cold therapy, self management, education and home program  Decreased strength - therapeutic exercise, therapeutic activities and home program  Decreased function - therapeutic activities and home program    Therapy Evaluation Codes:   1) History comprised of:   Personal factors that impact the plan of care:      None.    Comorbidity factors that impact the plan of care are:      None.     Medications impacting care: None.  2) Examination of Body Systems comprised of:   Body structures and functions that impact the plan of care:      Knee.   Activity limitations that impact the plan of care are:      Jumping, Running, Sports, Squatting/kneeling, Stairs, Standing and Walking.  3) Clinical presentation characteristics  are:   Stable/Uncomplicated.  4) Decision-Making    Low complexity using standardized patient assessment instrument and/or measureable assessment of functional outcome.  Cumulative Therapy Evaluation is: Low complexity.    Previous and current functional limitations:  (See Goal Flow Sheet for this information)    Short term and Long term goals: (See Goal Flow Sheet for this information)         Re: Afsaneh Estrella   :   1990    Communication ability:  Patient appears to be able to clearly communicate and understand verbal and written communication and follow directions correctly.  Treatment Explanation - The following has been discussed with the patient:   RX ordered/plan of care  Anticipated outcomes  Possible risks and side effects  This patient would benefit from PT intervention to resume normal activities.   Rehab potential is good.    Frequency:  1 X week, once daily  Duration:  for 6 weeks  Discharge Plan:  Achieve all LTG.  Independent in home treatment program.  Reach maximal therapeutic benefit.    Please refer to the daily flowsheet for treatment today, total treatment time and time spent performing 1:1 timed codes.       Thank you for your referral.    INQUIRIES  Therapist: YOHANA Rosales PT  [address]  [address]  [place] [address]  Phone: [phone]  Fax: [phone]

## 2019-07-11 ENCOUNTER — TRANSFERRED RECORDS (OUTPATIENT)
Dept: HEALTH INFORMATION MANAGEMENT | Facility: CLINIC | Age: 29
End: 2019-07-11

## 2019-07-22 ENCOUNTER — THERAPY VISIT (OUTPATIENT)
Dept: PHYSICAL THERAPY | Facility: CLINIC | Age: 29
End: 2019-07-22
Payer: COMMERCIAL

## 2019-07-22 DIAGNOSIS — M25.561 PAIN IN BOTH KNEES: ICD-10-CM

## 2019-07-22 DIAGNOSIS — M25.562 PAIN IN BOTH KNEES: ICD-10-CM

## 2019-07-22 DIAGNOSIS — M22.2X9 PATELLOFEMORAL PAIN SYNDROME: ICD-10-CM

## 2019-07-22 PROCEDURE — 97530 THERAPEUTIC ACTIVITIES: CPT | Mod: GP | Performed by: PHYSICAL THERAPIST

## 2019-07-22 PROCEDURE — 97110 THERAPEUTIC EXERCISES: CPT | Mod: GP | Performed by: PHYSICAL THERAPIST

## 2019-08-05 ENCOUNTER — THERAPY VISIT (OUTPATIENT)
Dept: PHYSICAL THERAPY | Facility: CLINIC | Age: 29
End: 2019-08-05
Payer: COMMERCIAL

## 2019-08-05 DIAGNOSIS — M25.561 PAIN IN BOTH KNEES: ICD-10-CM

## 2019-08-05 DIAGNOSIS — M22.2X9 PATELLOFEMORAL PAIN SYNDROME: ICD-10-CM

## 2019-08-05 DIAGNOSIS — M25.562 PAIN IN BOTH KNEES: ICD-10-CM

## 2019-08-05 PROCEDURE — 97110 THERAPEUTIC EXERCISES: CPT | Mod: GP | Performed by: PHYSICAL THERAPIST

## 2019-08-05 PROCEDURE — 97112 NEUROMUSCULAR REEDUCATION: CPT | Mod: GP | Performed by: PHYSICAL THERAPIST

## 2019-12-13 PROBLEM — M22.2X9 PATELLOFEMORAL PAIN SYNDROME: Status: RESOLVED | Noted: 2019-07-09 | Resolved: 2019-12-13

## 2019-12-13 PROBLEM — M25.562 PAIN IN BOTH KNEES: Status: RESOLVED | Noted: 2019-07-09 | Resolved: 2019-12-13

## 2019-12-13 PROBLEM — M25.561 PAIN IN BOTH KNEES: Status: RESOLVED | Noted: 2019-07-09 | Resolved: 2019-12-13

## 2019-12-13 NOTE — PROGRESS NOTES
Discharge Note    Progress reporting period is from last progress note on   to Aug 5, 2019.    Afsaneh failed to follow up and current status is unknown.  Please see information below for last relevant information on current status.  Patient seen for 3 visits.    SUBJECTIVE  Subjective changes noted by patient:  Patient reports feeling better and is working out more and tolerating more intense activity.  .  Current pain level is 0/10.     Previous pain level was  3/10.   Changes in function:  Yes (See Goal flowsheet attached for changes in current functional level)  Adverse reaction to treatment or activity: None    OBJECTIVE  Changes noted in objective findings: tolerated all progressions      ASSESSMENT/PLAN  Diagnosis: Bilateral knee pain/ PFPS   Updated problem list and treatment plan:   Pain - HEP  Decreased function - HEP  STG/LTGs have been met or progress has been made towards goals:  Yes, please see goal flowsheet for most current information  Assessment of Progress: current status is unknown.    Last current status: Pt is progressing well   Self Management Plans:  HEP  I have re-evaluated this patient and find that the nature, scope, duration and intensity of the therapy is appropriate for the medical condition of the patient.  Afsaneh continues to require the following intervention to meet STG and LTG's:  HEP.    Recommendations:  Discharge with current home program.  Patient to follow up with MD as needed.    Please refer to the daily flowsheet for treatment today, total treatment time and time spent performing 1:1 timed codes.

## 2020-03-11 ENCOUNTER — HEALTH MAINTENANCE LETTER (OUTPATIENT)
Age: 30
End: 2020-03-11

## 2021-01-03 ENCOUNTER — HEALTH MAINTENANCE LETTER (OUTPATIENT)
Age: 31
End: 2021-01-03

## 2021-07-21 ENCOUNTER — OFFICE VISIT (OUTPATIENT)
Dept: FAMILY MEDICINE | Facility: CLINIC | Age: 31
End: 2021-07-21
Payer: COMMERCIAL

## 2021-07-21 VITALS
OXYGEN SATURATION: 99 % | HEART RATE: 58 BPM | DIASTOLIC BLOOD PRESSURE: 88 MMHG | HEIGHT: 64 IN | SYSTOLIC BLOOD PRESSURE: 132 MMHG | WEIGHT: 148.8 LBS | BODY MASS INDEX: 25.4 KG/M2 | TEMPERATURE: 98.2 F

## 2021-07-21 DIAGNOSIS — Z30.011 ENCOUNTER FOR INITIAL PRESCRIPTION OF CONTRACEPTIVE PILLS: ICD-10-CM

## 2021-07-21 DIAGNOSIS — Z12.4 CERVICAL CANCER SCREENING: ICD-10-CM

## 2021-07-21 DIAGNOSIS — L40.9 PSORIASIS: ICD-10-CM

## 2021-07-21 DIAGNOSIS — K44.9 HIATAL HERNIA: ICD-10-CM

## 2021-07-21 DIAGNOSIS — Z00.00 ROUTINE GENERAL MEDICAL EXAMINATION AT A HEALTH CARE FACILITY: Primary | ICD-10-CM

## 2021-07-21 DIAGNOSIS — K21.9 GASTROESOPHAGEAL REFLUX DISEASE WITHOUT ESOPHAGITIS: ICD-10-CM

## 2021-07-21 PROCEDURE — 99385 PREV VISIT NEW AGE 18-39: CPT | Performed by: FAMILY MEDICINE

## 2021-07-21 PROCEDURE — G0145 SCR C/V CYTO,THINLAYER,RESCR: HCPCS | Performed by: FAMILY MEDICINE

## 2021-07-21 RX ORDER — FAMOTIDINE 20 MG/1
20 TABLET, FILM COATED ORAL 2 TIMES DAILY
COMMUNITY
Start: 2021-07-21

## 2021-07-21 RX ORDER — LEVONORGESTREL/ETHIN.ESTRADIOL 0.1-0.02MG
1 TABLET ORAL DAILY
Qty: 84 TABLET | Refills: 3 | Status: SHIPPED | OUTPATIENT
Start: 2021-07-21

## 2021-07-21 ASSESSMENT — ENCOUNTER SYMPTOMS
BREAST MASS: 0
NAUSEA: 0
ARTHRALGIAS: 0
FREQUENCY: 0
DYSURIA: 0
MYALGIAS: 0
HEMATURIA: 0
HEADACHES: 0
SHORTNESS OF BREATH: 0
WEAKNESS: 0
CONSTIPATION: 0
DIARRHEA: 0
JOINT SWELLING: 0
PARESTHESIAS: 0
SORE THROAT: 0
FEVER: 0
HEMATOCHEZIA: 0
ABDOMINAL PAIN: 0
COUGH: 0
NERVOUS/ANXIOUS: 0
HEARTBURN: 0
EYE PAIN: 0

## 2021-07-21 ASSESSMENT — MIFFLIN-ST. JEOR: SCORE: 1387.7

## 2021-07-21 NOTE — PROGRESS NOTES
SUBJECTIVE:   CC: Afsaneh Estrella is an 31 year old woman who presents for preventive health visit.     Patient has been advised of split billing requirements and indicates understanding: Yes  Healthy Habits:     Getting at least 3 servings of Calcium per day:  Yes    Bi-annual eye exam:  Yes    Dental care twice a year:  Yes    Sleep apnea or symptoms of sleep apnea:  None    Diet:  Regular (no restrictions)    Frequency of exercise:  4-5 days/week    Duration of exercise:  45-60 minutes    Taking medications regularly:  Yes    Barriers to taking medications:  None    Medication side effects:  None    PHQ-2 Total Score: 0    Additional concerns today:  Yes    - Patient would like to start OCPs.  LMP 6/28/21.  Periods are irregular.  Not currently sexually active, but planning to be soon.  Previously took OCPs and did well on them  - Has psoriasis that is managed by dermatology.  Stable.    - History of GERD and recently found to have a small hiatal hernia.  She is doing well taking Pepcid BID.    Today's PHQ-2 Score:   PHQ-2 ( 1999 [employer]) 7/21/2021   Q1: Little interest or pleasure in doing things 0   Q2: Feeling down, depressed or hopeless 0   PHQ-2 Score 0   Q1: Little interest or pleasure in doing things Not at all   Q2: Feeling down, depressed or hopeless Not at all   PHQ-2 Score 0       Abuse: Current or Past (Physical, Sexual or Emotional) - No  Do you feel safe in your environment? Yes    Have you ever done Advance Care Planning? (For example, a Health Directive, POLST, or a discussion with a medical provider or your loved ones about your wishes): No, advance care planning information given to patient to review.  Patient declined advance care planning discussion at this time.    Social History     Tobacco Use     Smoking status: Never Smoker     Smokeless tobacco: Never Used   Substance Use Topics     Alcohol use: Yes     Comment: Very rarely     Alcohol Use 7/21/2021   Prescreen: >3 drinks/day or >7  drinks/week? No   Prescreen: >3 drinks/day or >7 drinks/week? -       Reviewed orders with patient.  Reviewed health maintenance and updated orders accordingly - Yes  Patient Active Problem List   Diagnosis     Migraine without aura and without status migrainosus, not intractable     Gastroesophageal reflux disease, esophagitis presence not specified     Hiatal hernia     Psoriasis     History reviewed. No pertinent surgical history.    Social History     Tobacco Use     Smoking status: Never Smoker     Smokeless tobacco: Never Used   Substance Use Topics     Alcohol use: Yes     Comment: Very rarely     Family History   Problem Relation Age of Onset     Hypertension Mother      Hypertension Father            Breast Cancer Screening:  Breast CA Risk Assessment (FHS-7) 7/21/2021   Do you have a family history of breast, colon, or ovarian cancer? No / Unknown     Patient under 40 years of age: Routine Mammogram Screening not recommended.   Pertinent mammograms are reviewed under the imaging tab.    History of abnormal Pap smear: NO - age 21-29 PAP every 3 years recommended  PAP / HPV 6/13/2018   PAP NIL     Reviewed and updated as needed this visit by clinical staff  Tobacco  Allergies  Meds   Med Hx  Surg Hx  Fam Hx  Soc Hx        Review of Systems   Constitutional: Negative for fever.   HENT: Negative for congestion, ear pain, hearing loss and sore throat.    Eyes: Negative for pain and visual disturbance.   Respiratory: Negative for cough and shortness of breath.    Cardiovascular: Negative for chest pain and peripheral edema.   Gastrointestinal: Negative for abdominal pain, constipation, diarrhea, heartburn, hematochezia and nausea.   Breasts:  Negative for tenderness, breast mass and discharge.   Genitourinary: Negative for dysuria, frequency, genital sores, hematuria, pelvic pain, urgency, vaginal bleeding and vaginal discharge.   Musculoskeletal: Negative for arthralgias, joint swelling and myalgias.  "  Skin: Negative for rash.   Neurological: Negative for weakness, headaches and paresthesias.   Psychiatric/Behavioral: Negative for mood changes. The patient is not nervous/anxious.      OBJECTIVE:   /88 (BP Location: Right arm, Patient Position: Chair, Cuff Size: Adult Regular)   Pulse 58   Temp 98.2  F (36.8  C) (Oral)   Ht 1.63 m (5' 4.17\")   Wt 67.5 kg (148 lb 12.8 oz)   LMP 06/28/2021 (Approximate)   SpO2 99%   BMI 25.40 kg/m    Physical Exam  GENERAL: healthy, alert and no distress  EYES: Eyes grossly normal to inspection, PERRL and conjunctivae and sclerae normal  HENT: ear canals and TM's normal, nose and mouth without ulcers or lesions  NECK: no adenopathy, no asymmetry, masses, or scars and thyroid normal to palpation  RESP: lungs clear to auscultation - no rales, rhonchi or wheezes  BREAST: normal without masses, tenderness or nipple discharge and no palpable axillary masses or adenopathy  CV: regular rate and rhythm, normal S1 S2, no S3 or S4, no murmur, click or rub  ABDOMEN: soft, nontender, no hepatosplenomegaly, no masses   (female): normal female external genitalia, normal urethral meatus, vaginal mucosa pink, moist, well rugated, and normal cervix without discharge  MS: no gross musculoskeletal defects noted, no edema  SKIN: no suspicious lesions or rashes  NEURO: Normal strength and tone, mentation intact and speech normal  PSYCH: mentation appears normal, affect normal/bright    ASSESSMENT/PLAN:   1. Routine general medical examination at a health care facility    2. Cervical cancer screening  - Pap imaged thin layer screen reflex to HPV if ASCUS - recommend age 25 - 29    3. Encounter for initial prescription of contraceptive pills  - Re-start OCPs.  Not currently sexually active so did not get a pregnancy test   - levonorgestrel-ethinyl estradiol (AVIANE) 0.1-20 MG-MCG tablet; Take 1 tablet by mouth daily  Dispense: 84 tablet; Refill: 3    4. Hiatal hernia  5. Gastroesophageal " "reflux disease without esophagitis  - Well controlled on Pepcid BID    6. Psoriasis  - Stable, managed by dermatology       Patient has been advised of split billing requirements and indicates understanding: Yes  COUNSELING:  Reviewed preventive health counseling, as reflected in patient instructions    Estimated body mass index is 25.4 kg/m  as calculated from the following:    Height as of this encounter: 1.63 m (5' 4.17\").    Weight as of this encounter: 67.5 kg (148 lb 12.8 oz).    She reports that she has never smoked. She has never used smokeless tobacco.      Counseling Resources:  ATP IV Guidelines  Pooled Cohorts Equation Calculator  Breast Cancer Risk Calculator  BRCA-Related Cancer Risk Assessment: FHS-7 Tool  FRAX Risk Assessment  ICSI Preventive Guidelines  Dietary Guidelines for Americans, 2010  USDA's MyPlate  ASA Prophylaxis  Lung CA Screening    Sumi Dewey DO  [facility]  "

## 2021-07-23 LAB
BKR LAB AP GYN ADEQUACY: NORMAL
BKR LAB AP GYN INTERPRETATION: NORMAL
BKR LAB AP HPV REFLEX: NORMAL
BKR LAB AP LMP: NORMAL
BKR LAB AP PREVIOUS ABNORMAL: NORMAL
PATH REPORT.COMMENTS IMP SPEC: NORMAL
PATH REPORT.RELEVANT HX SPEC: NORMAL

## 2021-10-10 ENCOUNTER — HEALTH MAINTENANCE LETTER (OUTPATIENT)
Age: 31
End: 2021-10-10

## 2022-02-17 PROBLEM — K21.9 GASTROESOPHAGEAL REFLUX DISEASE: Status: ACTIVE | Noted: 2018-06-13

## 2022-09-18 ENCOUNTER — HEALTH MAINTENANCE LETTER (OUTPATIENT)
Age: 32
End: 2022-09-18

## 2023-04-24 ENCOUNTER — LAB REQUISITION (OUTPATIENT)
Dept: LAB | Facility: CLINIC | Age: 33
End: 2023-04-24

## 2023-04-24 DIAGNOSIS — Z31.9 ENCOUNTER FOR PROCREATIVE MANAGEMENT, UNSPECIFIED: ICD-10-CM

## 2023-04-24 LAB
PROLACTIN SERPL 3RD IS-MCNC: 15 NG/ML (ref 5–23)
TSH SERPL DL<=0.005 MIU/L-ACNC: 2.86 UIU/ML (ref 0.3–4.2)

## 2023-04-24 PROCEDURE — 84443 ASSAY THYROID STIM HORMONE: CPT | Performed by: OBSTETRICS & GYNECOLOGY

## 2023-04-24 PROCEDURE — 83498 ASY HYDROXYPROGESTERONE 17-D: CPT | Performed by: OBSTETRICS & GYNECOLOGY

## 2023-04-24 PROCEDURE — 84146 ASSAY OF PROLACTIN: CPT | Performed by: OBSTETRICS & GYNECOLOGY

## 2023-04-24 PROCEDURE — 84403 ASSAY OF TOTAL TESTOSTERONE: CPT | Performed by: OBSTETRICS & GYNECOLOGY

## 2023-04-27 LAB
17OHP SERPL-MCNC: 90 NG/DL
TESTOST SERPL-MCNC: 45 NG/DL (ref 8–60)

## 2023-05-09 ENCOUNTER — TRANSCRIBE ORDERS (OUTPATIENT)
Dept: OTHER | Age: 33
End: 2023-05-09

## 2023-05-09 DIAGNOSIS — G43.009 MIGRAINE WITHOUT AURA AND WITHOUT STATUS MIGRAINOSUS, NOT INTRACTABLE: Primary | ICD-10-CM

## 2023-05-18 ENCOUNTER — THERAPY VISIT (OUTPATIENT)
Dept: PHYSICAL THERAPY | Facility: CLINIC | Age: 33
End: 2023-05-18
Attending: NURSE PRACTITIONER
Payer: COMMERCIAL

## 2023-05-18 DIAGNOSIS — G43.009 MIGRAINE WITHOUT AURA AND WITHOUT STATUS MIGRAINOSUS, NOT INTRACTABLE: ICD-10-CM

## 2023-05-18 PROCEDURE — 97161 PT EVAL LOW COMPLEX 20 MIN: CPT | Mod: GP | Performed by: PHYSICAL THERAPIST

## 2023-05-18 PROCEDURE — 97110 THERAPEUTIC EXERCISES: CPT | Mod: GP | Performed by: PHYSICAL THERAPIST

## 2023-05-18 PROCEDURE — 97140 MANUAL THERAPY 1/> REGIONS: CPT | Mod: GP | Performed by: PHYSICAL THERAPIST

## 2023-05-18 NOTE — PROGRESS NOTES
PHYSICAL THERAPY EVALUATION  Type of Visit: Evaluation    See electronic medical record for Abuse and Falls Screening details.    Subjective      Presenting condition or subjective complaint:  Migraines, stiff neck, B UT/upper back pain  Date of onset:   2023(MD esteves). Patient describes a 5 year history of current sx.   Relevant medical history:   Migraines  Dates & types of surgery:  none    Prior diagnostic imaging/testing results:     none  Prior therapy history for the same diagnosis, illness or injury:    Many year history of migraines.    Prior Level of Function   Transfers: Independent  Ambulation: Independent  ADL: Independent  IADL: N/A    Living Environment  Social support:   Significant other/spouse  Type of home:   N/A  Stairs to enter the home:    N/A     Ramp:   N/A  Stairs inside the home:       N/A  Help at home:  N/A  Equipment owned:   NA    Employment:    Trauma Registrar  Hobbies/Interests:  Hiking, walking, reading    Patient goals for therapy:  Reduce increased upper back and neck tension and reduce migraine HA frequency    Pain assessment: Pain present  Location: B UT/cervical areas/Ratin/10 at rest, up to 8-10/10 at end of work day      Objective   CERVICAL SPINE EVALUATION  PAIN: Pain Level at Rest: 1/10 and located at B cervical/UT/intrascapular areas and up into head. Pain can be aching, sharp and/or shooting. Pain is intermittent, HA frequency 6 migraine HA's/month. Pain at worst is 8-10/10 and exacerbated with work acitivities/at end of work day. Symptoms relieved by various things such as heat/cold, HA meds, pt uses tennis balls in sock for SOR. Overall, symptoms have progressively gotten worse.  INTEGUMENTARY (edema, incisions): WNL  POSTURE: Sitting Posture: Rounded shoulders, Forward head, Thoracic kyphosis increased  GAIT:   Weightbearing Status: WBAT  Assistive Device(s): None  Gait Deviations: WNL  Balance/Proprioception: WNL  Weight Bearing Alignment: WNL  ROM: AROM  WNL    MYOTOMES: WNL  DTR S: WNL  CORD SIGNS: WNL  DERMATOMES: WNL  NEURAL TENSION: Cervical WNL  FLEXIBILITY: Decreased sternocleidomastoid L, Decreased scalenes L, Decreased rhomboids L, Decreased upper trap L, Decreased levator L, Decreased pectoralis major L, Decreased pectoralis minor L, Decreased sternocleidomastoid  R, Decreased scalenes R, Decreased rhomboids R, Decreased upper trap R, Decreased levator R, Decreased pectoralis major R, Decreased pectoralis minor R   Special Tests: WNL  PALPATION:   + Tenderness At Location Left Right   Sternocleidomastoid + +   Scalenes + +   Rhomboids + +   Facet - -   Upper Trap + +   Levator + +   Erector Spinae + +   Suboccipitals + +     SPINAL SEGMENTAL CONCLUSIONS:    Left Right   C1     C2     C3     C4     C5 Hypo Hypo   C6 Hypo Hypo   C7 Hypo Hypo   C8 Hypo Hypo   T1 Hypo Hypo   T2 Hypo Hypo   T3     T4     T5     T6     T7     T8     T9     T10     T11     T12           Assessment & Plan   CLINICAL IMPRESSIONS   Medical Diagnosis:    Migraine HA's  Treatment Diagnosis:   Migraine HA's  Impression/Assessment: Patient is a 32 year old female with upper back, neck and HA pain complaints.  The following significant findings have been identified: Pain, Decreased ROM/flexibility, Decreased joint mobility, Decreased strength and Impaired posture. These impairments interfere with their ability to perform work tasks, household chores and driving  as compared to previous level of function.     Clinical Decision Making (Complexity):   Clinical Presentation: Stable/Uncomplicated  Clinical Presentation Rationale: based on medical and personal factors listed in PT evaluation  Clinical Decision Making (Complexity): Low complexity    PLAN OF CARE  Treatment Interventions:  Interventions: Manual Therapy, Neuromuscular Re-education, Therapeutic Exercise, Self-Care/Home Management    Long Term Goals   Patient will experience 2-4 migraine HA's/month with intensity reduced to 5/10 or  less.         Frequency of Treatment:  1x/week   Duration of Treatment:  8 weeks    Recommended Referrals to Other Professionals: Physical Therapy  Education Assessment: Patient, listening, reading, vidoes, demonstration, no barriers to learning.       Risks and benefits of evaluation/treatment have been explained.   Patient/Family/caregiver agrees with Plan of Care.     Evaluation Time:   15(40 minutes total)         Signing Clinician: Heather Mcclain, PT

## 2023-05-30 ENCOUNTER — THERAPY VISIT (OUTPATIENT)
Dept: PHYSICAL THERAPY | Facility: CLINIC | Age: 33
End: 2023-05-30
Payer: COMMERCIAL

## 2023-05-30 DIAGNOSIS — G43.009 MIGRAINE WITHOUT AURA AND WITHOUT STATUS MIGRAINOSUS, NOT INTRACTABLE: Primary | ICD-10-CM

## 2023-05-30 PROCEDURE — 97140 MANUAL THERAPY 1/> REGIONS: CPT | Mod: GP | Performed by: PHYSICAL THERAPIST

## 2023-05-30 PROCEDURE — 97112 NEUROMUSCULAR REEDUCATION: CPT | Mod: GP | Performed by: PHYSICAL THERAPIST

## 2023-05-30 PROCEDURE — 97110 THERAPEUTIC EXERCISES: CPT | Mod: GP | Performed by: PHYSICAL THERAPIST

## 2023-06-15 ENCOUNTER — THERAPY VISIT (OUTPATIENT)
Dept: PHYSICAL THERAPY | Facility: CLINIC | Age: 33
End: 2023-06-15
Payer: COMMERCIAL

## 2023-06-15 DIAGNOSIS — G43.009 MIGRAINE WITHOUT AURA AND WITHOUT STATUS MIGRAINOSUS, NOT INTRACTABLE: Primary | ICD-10-CM

## 2023-06-15 PROCEDURE — 97110 THERAPEUTIC EXERCISES: CPT | Mod: GP | Performed by: PHYSICAL THERAPIST

## 2023-06-15 PROCEDURE — 97140 MANUAL THERAPY 1/> REGIONS: CPT | Mod: GP | Performed by: PHYSICAL THERAPIST

## 2023-06-29 ENCOUNTER — THERAPY VISIT (OUTPATIENT)
Dept: PHYSICAL THERAPY | Facility: CLINIC | Age: 33
End: 2023-06-29
Payer: COMMERCIAL

## 2023-06-29 DIAGNOSIS — G43.009 MIGRAINE WITHOUT AURA AND WITHOUT STATUS MIGRAINOSUS, NOT INTRACTABLE: Primary | ICD-10-CM

## 2023-06-29 PROCEDURE — 97140 MANUAL THERAPY 1/> REGIONS: CPT | Mod: GP | Performed by: PHYSICAL THERAPIST

## 2023-06-29 PROCEDURE — 97110 THERAPEUTIC EXERCISES: CPT | Mod: GP | Performed by: PHYSICAL THERAPIST

## 2023-06-29 PROCEDURE — 97112 NEUROMUSCULAR REEDUCATION: CPT | Mod: GP | Performed by: PHYSICAL THERAPIST

## 2023-07-14 ENCOUNTER — THERAPY VISIT (OUTPATIENT)
Dept: PHYSICAL THERAPY | Facility: CLINIC | Age: 33
End: 2023-07-14
Payer: COMMERCIAL

## 2023-07-14 DIAGNOSIS — G43.009 MIGRAINE WITHOUT AURA AND WITHOUT STATUS MIGRAINOSUS, NOT INTRACTABLE: Primary | ICD-10-CM

## 2023-07-14 PROCEDURE — 97110 THERAPEUTIC EXERCISES: CPT | Mod: GP | Performed by: PHYSICAL THERAPIST

## 2023-07-14 PROCEDURE — 97112 NEUROMUSCULAR REEDUCATION: CPT | Mod: GP | Performed by: PHYSICAL THERAPIST

## 2023-07-14 PROCEDURE — 97140 MANUAL THERAPY 1/> REGIONS: CPT | Mod: GP | Performed by: PHYSICAL THERAPIST

## 2023-07-19 NOTE — PROGRESS NOTES
DISCHARGE:  Patient has completed 5 PT visits.     07/14/23 0500   Appointment Info   Signing clinician's name / credentials [email or web address]   Total/Authorized Visits E&T(8)   Visits Used 5   Medical Diagnosis Migraines HA's   PT Tx Diagnosis Migraine HA's   Other pertinent information Pt works desk job x8 hours/day   Progress Note/Certification   Onset of illness/injury or Date of Surgery 05/09/23   Therapy Frequency 1x/week   Predicted Duration 8 weeks   Progress Note Completed Date 07/14/23   PT Goal 1   Goal Identifier HA   Goal Description Patient will experience 2-4 migraine HA's/month with intensity reduced to 5/10 or less   Rationale to maximize safety and independence within the community;to maximize safety and independence with transportation  (Reduced missed days of work)   Goal Progress Pt experiences 1 migraine HA/month with PL up to 4-5/10   Target Date 07/13/23   Date Met 07/14/23   Subjective Report   Subjective Report Patient returns to clinic without pain. In the past 2 weeks, she has experienced increased work stress, got sick with a cold, and did not perform her HEP. But pt was able to do some running. Felt that she suffered for not having performed her HEP. She has gotten back on track with HEP and regular exercising at the gym. Overall, patient feeling that she has figured multiple things out about her HA's and how they behave and what she needs to do to prevent and or reduce the frequency. Currently has only had one HA in the past month. She feel sready to resume independent management at this time.   Objective Measures   Objective Measures Objective Measure 1;Objective Measure 2   Objective Measure 1   Objective Measure Posture. AROM, strength   Details Patient demonstrates improved sitting posture from poor to fair. AROM of cervical spine: all motions remains well WNL. Cervical myotomes also remain 5/5 andsymmetrical. Reduced P/A thoracic spinal segment mobility.   Objective Measure 2  "  Objective Measure NDI improved from 34% to 14%.   Treatment Interventions (PT)   Interventions Therapeutic Procedure/Exercise;Manual Therapy;Neuromuscular Re-education   Therapeutic Procedure/Exercise   Therapeutic Procedures: strength, endurance, ROM, flexibillity minutes (25917) 10   Therapeutic Procedures Ther Proc 2;Ther Proc 3   Ther Proc 1 UBE x8' F/B at 1.5 work load following manual work   Ther Proc 2 W and U position in doorway   Ther Proc 2 - Details W and U position in doorway 3x20\" each, reviewed as part of HEP   PTRx Ther Proc 1 Neutral Spine Slouch Overcorrect   PTRx Ther Proc 1 - Details HEP   PTRx Ther Proc 2 Scapular Retraction/Depression   PTRx Ther Proc 2 - Details HEP   PTRx Ther Proc 3 Shoulder Rolls   PTRx Ther Proc 3 - Details HEP   PTRx Ther Proc 4 Cervical Nodding   PTRx Ther Proc 4 - Details HEP   PTRx Ther Proc 5 Shoulder Theraband Low Row/Pulldown   PTRx Ther Proc 5 - Details see below   PTRx Ther Proc 6 Rowing   PTRx Ther Proc 6 - Details see below   PTRx Ther Proc 7 Pec Stretch Doorway   PTRx Ther Proc 7 - Details see above   PTRx Ther Proc 8 Thoracic Extension   PTRx Ther Proc 8 - Details seated 2x10 decrease/B-reviewed as part of HEP   PTRx Ther Proc 9 Spinal extensions   PTRx Ther Proc 9 - Details supine over foam roller 2x10 with foam roller at approx T5-6 level   Skilled Intervention Strengthening, assuring pt able to continue with HEP accurately   Patient Response/Progress Patient able to reproduce HEP accurately   Ther Proc 3 Supine: R/L cervical rot and SB stretching by PY x4'   Neuromuscular Re-education   Neuromuscular re-ed of mvmt, balance, coord, kinesthetic sense, posture, proprioception minutes (39905) 12   Neuromuscular Re-education Neuro Re-ed 2;Neuro Re-ed 3;Neuro Re-ed 4;Neuro Re-ed 5;Neuro Re-ed 6   Neuro Re-ed 1 posture   Neuro Re-ed 1 - Details reviewed slouch/over correct, location of neutral spine, use of lumbar support   Neuro Re-ed 2 Shoulder Theraband Low " "Row/Pulldown-2x15 with GTB, vc/mc for proper posture, performance   Neuro Re-ed 2 - Details HEP   Neuro Re-ed 3 Rowing-2x15 with GTB, vc/mc for proper posture, performance   Neuro Re-ed 3 - Details HEP   Neuro Re-ed 4 seated ther ball   Neuro Re-ed 4 - Details R/L lateral pelvic tilting, A/P pelvic tilting, CW/CCW pelvic circles, gentle bouncing, alt hip flexion and alt knee extension-nt   Skilled Intervention trunk control activation/core stability   Patient Response/Progress afir dynamic core stabilty demonstrated on the BOSU   Neuro Re-ed 5 BOSU   Neuro Re-ed 5 - Details Black side up: DL DF/PF, IN/EV, CW/CCW, minisquatting, balancing with feet together and feet in tandem x8'   Neuro Re-ed 6 ball bounce pass/chest pass and catch   Neuro Re-ed 6 - Details Yellow ther ball x4'   Manual Therapy   Manual Therapy: Mobilization, MFR, MLD, friction massage minutes (10814) 18   Manual Therapy Manual Therapy 3   Manual Therapy 1 cervical mobilizations   Manual Therapy 1 - Details supine: lower cervical/upper thoracic(C5-T1), grade III-IV, with cervical spine in resting in slight extension, cervical distraction 4x30\"   Manual Therapy 2 supine STM   Manual Therapy 2 - Details supine: B cervical/UT areas   Manual Therapy 3 Prone: P/A T2-T10, grade III-IV   Manual Therapy 3 - Details L SL: R scapular lateral tilt and rotational mobs, and STM to lateral border of R scap   Skilled Intervention reduced pain, improved cervical/thoracic spinal segemental mobility   Patient Response/Progress improved pain and increased flexibility felt   Education   Learner/Method Patient;Listening;Reading;Demonstration;Pictures/Video;No Barriers to Learning   Plan   Home program See PTRx   Updates to plan of care No changes to HEP   Total Session Time   Timed Code Treatment Minutes 40   Total Treatment Time (sum of timed and untimed services) 40         Reason for Discharge: Patient has met all goals.  Patient chooses to discontinue " therapy.    Equipment Issued: none    Discharge Plan: Patient to continue home program.    Referring Provider:  Ruby Sullivan

## 2024-05-05 ENCOUNTER — HEALTH MAINTENANCE LETTER (OUTPATIENT)
Age: 34
End: 2024-05-05

## 2025-05-17 ENCOUNTER — HEALTH MAINTENANCE LETTER (OUTPATIENT)
Age: 35
End: 2025-05-17